# Patient Record
Sex: FEMALE | Race: WHITE | Employment: OTHER | URBAN - NONMETROPOLITAN AREA
[De-identification: names, ages, dates, MRNs, and addresses within clinical notes are randomized per-mention and may not be internally consistent; named-entity substitution may affect disease eponyms.]

---

## 2017-09-19 ENCOUNTER — HISTORY (OUTPATIENT)
Dept: FAMILY MEDICINE | Facility: OTHER | Age: 77
End: 2017-09-19

## 2018-01-24 ENCOUNTER — DOCUMENTATION ONLY (OUTPATIENT)
Dept: FAMILY MEDICINE | Facility: OTHER | Age: 78
End: 2018-01-24

## 2018-01-24 PROBLEM — G47.30 SLEEP APNEA: Status: ACTIVE | Noted: 2018-01-24

## 2018-01-24 PROBLEM — I10 HYPERTENSION: Status: ACTIVE | Noted: 2018-01-24

## 2018-01-24 PROBLEM — F34.1 DYSTHYMIA: Status: ACTIVE | Noted: 2018-01-24

## 2018-01-24 PROBLEM — G43.909 MIGRAINE HEADACHE: Status: ACTIVE | Noted: 2018-01-24

## 2018-01-24 RX ORDER — MOXIFLOXACIN 5 MG/ML
SOLUTION/ DROPS OPHTHALMIC
COMMUNITY
Start: 2017-09-25

## 2018-01-24 RX ORDER — GABAPENTIN 300 MG/1
600 CAPSULE ORAL 3 TIMES DAILY
COMMUNITY

## 2018-01-24 RX ORDER — PRAMIPEXOLE DIHYDROCHLORIDE 0.25 MG/1
TABLET ORAL
COMMUNITY

## 2018-01-24 RX ORDER — LISINOPRIL 40 MG/1
40 TABLET ORAL DAILY
COMMUNITY

## 2018-01-24 RX ORDER — BACLOFEN 10 MG/1
10 TABLET ORAL 3 TIMES DAILY PRN
COMMUNITY
Start: 2015-09-03

## 2018-01-24 RX ORDER — KETOROLAC TROMETHAMINE 5 MG/ML
SOLUTION OPHTHALMIC
COMMUNITY
Start: 2017-09-25

## 2018-01-24 RX ORDER — TIMOLOL MALEATE 2.5 MG/ML
1 SOLUTION/ DROPS OPHTHALMIC 2 TIMES DAILY
COMMUNITY

## 2018-01-24 RX ORDER — PREDNISOLONE ACETATE 10 MG/ML
SUSPENSION/ DROPS OPHTHALMIC
COMMUNITY
Start: 2017-09-25

## 2018-01-24 RX ORDER — LEVOTHYROXINE SODIUM 50 UG/1
50 TABLET ORAL
COMMUNITY

## 2018-01-24 RX ORDER — FLUTICASONE PROPIONATE 110 UG/1
1 AEROSOL, METERED RESPIRATORY (INHALATION) 2 TIMES DAILY
COMMUNITY
Start: 2015-06-03

## 2018-01-24 RX ORDER — LATANOPROST 50 UG/ML
1 SOLUTION/ DROPS OPHTHALMIC AT BEDTIME
COMMUNITY

## 2018-01-24 RX ORDER — HYDROCHLOROTHIAZIDE 12.5 MG/1
12.5 TABLET ORAL DAILY
COMMUNITY
Start: 2015-05-14

## 2018-01-24 RX ORDER — ONDANSETRON 4 MG/1
4 TABLET, FILM COATED ORAL EVERY 8 HOURS PRN
COMMUNITY
Start: 2015-06-03

## 2018-01-24 RX ORDER — FLUTICASONE PROPIONATE 50 MCG
1 SPRAY, SUSPENSION (ML) NASAL DAILY
COMMUNITY

## 2018-01-24 RX ORDER — LISINOPRIL 20 MG/1
20 TABLET ORAL DAILY
COMMUNITY
Start: 2015-05-14

## 2018-01-24 RX ORDER — RIZATRIPTAN BENZOATE 5 MG/1
5 TABLET, ORALLY DISINTEGRATING ORAL 2 TIMES DAILY PRN
COMMUNITY

## 2018-06-09 ENCOUNTER — OFFICE VISIT (OUTPATIENT)
Dept: FAMILY MEDICINE | Facility: OTHER | Age: 78
End: 2018-06-09
Attending: NURSE PRACTITIONER
Payer: MEDICARE

## 2018-06-09 VITALS
WEIGHT: 188.19 LBS | DIASTOLIC BLOOD PRESSURE: 96 MMHG | TEMPERATURE: 98.3 F | HEART RATE: 84 BPM | SYSTOLIC BLOOD PRESSURE: 180 MMHG | BODY MASS INDEX: 33.34 KG/M2

## 2018-06-09 DIAGNOSIS — R39.89 URINARY PROBLEM: Primary | ICD-10-CM

## 2018-06-09 DIAGNOSIS — R39.89 SUSPECTED URINARY TRACT INFECTION: ICD-10-CM

## 2018-06-09 LAB

## 2018-06-09 PROCEDURE — G0463 HOSPITAL OUTPT CLINIC VISIT: HCPCS

## 2018-06-09 PROCEDURE — 81001 URINALYSIS AUTO W/SCOPE: CPT | Performed by: NURSE PRACTITIONER

## 2018-06-09 PROCEDURE — 99213 OFFICE O/P EST LOW 20 MIN: CPT | Performed by: NURSE PRACTITIONER

## 2018-06-09 PROCEDURE — 87086 URINE CULTURE/COLONY COUNT: CPT | Performed by: NURSE PRACTITIONER

## 2018-06-09 RX ORDER — DICYCLOMINE HYDROCHLORIDE 10 MG/1
CAPSULE ORAL
COMMUNITY
Start: 2018-05-18

## 2018-06-09 RX ORDER — SULFAMETHOXAZOLE/TRIMETHOPRIM 800-160 MG
1 TABLET ORAL 2 TIMES DAILY
Qty: 6 TABLET | Refills: 0 | Status: SHIPPED | OUTPATIENT
Start: 2018-06-09 | End: 2018-06-12

## 2018-06-09 RX ORDER — ALLOPURINOL 100 MG/1
TABLET ORAL
COMMUNITY
Start: 2018-04-21

## 2018-06-09 RX ORDER — CEFPODOXIME PROXETIL 100 MG/1
TABLET, FILM COATED ORAL
COMMUNITY
Start: 2018-05-25

## 2018-06-09 RX ORDER — DIPHENOXYLATE HCL/ATROPINE 2.5-.025MG
TABLET ORAL
COMMUNITY
Start: 2018-05-03

## 2018-06-09 RX ORDER — BUTALBITAL, ACETAMINOPHEN AND CAFFEINE 50; 325; 40 MG/1; MG/1; MG/1
TABLET ORAL
COMMUNITY
Start: 2018-05-03

## 2018-06-09 NOTE — PROGRESS NOTES
Nursing Notes:   Anai Haynes CMA  6/9/2018  6:24 PM  Signed  Patient presents to the clinic for dizziness and urinary urgency that started night before last. Patient reports having dizziness also today. She states she wants to be tested for a urinary tract infection as she had dizziness as a UTI symptom previously.   Anai ZHENG CMA.......6/9/2018..5:32 PM      SUBJECTIVE:   Carina Bowles is a 77 year old female who presents to clinic today for the following health issues:    General illness.  She complains of dizziness and urinary urgency.  2 weeks ago this had occurred as well and she ended up having a urinary tract infection.  She denies fevers, chills, dysuria, nausea, vomiting.  The dizziness does make her have to lay down.  She denies weakness.  She is eating and drinking per her norm.    Prior to checking in to the rapid clinic we recommended that she actually go to the emergency room for her symptoms.  We explained to her that the rapid clinic does not usually workup dizziness.  She states that this is a UTI and she wants to be seen here tonight.  Explained to her that I would only work up for a UTI.  She was okay with this and verbalized understanding.  She understood that if there was further problems that she would have to go to the emergency room.    Problem list and histories reviewed & adjusted, as indicated.  Additional history: as documented    Current Outpatient Prescriptions   Medication Sig Dispense Refill     allopurinol (ZYLOPRIM) 100 MG tablet        amitriptyline (ELAVIL) 25 MG tablet Take 25 mg by mouth At Bedtime       baclofen (LIORESAL) 10 MG tablet Take 10 mg by mouth 3 times daily as needed       butalbital-acetaminophen-caffeine (FIORICET/ESGIC) -40 MG per tablet        cefpodoxime (VANTIN) 100 MG tablet        dicyclomine (BENTYL) 10 MG capsule        diphenoxylate-atropine (LOMOTIL) 2.5-0.025 MG per tablet        fluticasone (FLONASE) 50 MCG/ACT spray Spray 1 spray into both  nostrils daily       fluticasone (FLOVENT HFA) 110 MCG/ACT Inhaler Inhale 1 puff into the lungs 2 times daily       gabapentin (NEURONTIN) 300 MG capsule Take 600 mg by mouth 3 times daily       hydrochlorothiazide 12.5 MG TABS tablet Take 12.5 mg by mouth daily       ketorolac (ACULAR) 0.5 % ophthalmic solution        latanoprost (XALATAN) 0.005 % ophthalmic solution Place 1 drop into both eyes At Bedtime       levothyroxine (SYNTHROID/LEVOTHROID) 50 MCG tablet Take 50 mcg by mouth every morning (before breakfast)       lisinopril (PRINIVIL/ZESTRIL) 20 MG tablet Take 20 mg by mouth daily       lisinopril (PRINIVIL/ZESTRIL) 40 MG tablet Take 40 mg by mouth daily       metroNIDAZOLE (METROCREAM) 0.75 % cream Apply topically 2 times daily       moxifloxacin (VIGAMOX) 0.5 % ophthalmic solution        ondansetron (ZOFRAN) 4 MG tablet Take 4 mg by mouth every 8 hours as needed for nausea or vomiting       piroxicam (FELDENE) 20 MG capsule Take 20 mg by mouth daily with food       pramipexole (MIRAPEX) 0.25 MG tablet Take 0.25 mg by mouth once daily. Take 1-3 tabs every evening       prednisoLONE acetate (PRED FORTE) 1 % ophthalmic susp        rizatriptan (MAXALT-MLT) 5 MG ODT tab Place 5 mg under the tongue 2 times daily as needed for migraine Give a minimum 2hrs apart. Max dose: 30mg per 24hrs       sertraline (ZOLOFT) 50 MG tablet Take by mouth daily       sulfamethoxazole-trimethoprim (BACTRIM DS/SEPTRA DS) 800-160 MG per tablet Take 1 tablet by mouth 2 times daily for 3 days 6 tablet 0     timolol (TIMOPTIC) 0.25 % ophthalmic solution Place 1 drop into both eyes 2 times daily       Allergies   Allergen Reactions     Amoxicillin Diarrhea and Nausea     Amoxicillin-Pot Clavulanate Diarrhea and Nausea     Augmentin     Atenolol Other (See Comments)     General unwell feeling     Atropine Other (See Comments)     Has glaucoma     Codeine GI Disturbance and Nausea     Droperidol      Other reaction(s): Restless  Legs/Feet  Properidol, intense restless leg     Levofloxacin Other (See Comments)     Worsens inflammatory processes per daughter     Oxybutynin      Other reaction(s): Headache     Penicillins Other (See Comments)     Has allergy to Penicillin per [facility] scanned record 9/23/14     Tizanidine      Other reaction(s): Dizziness     Tolterodine Other (See Comments)     Allergy [facility] Detrol [facility])     Zolpidem      Other reaction(s): Dizziness  Other reaction(s): Dizziness         ROS:  Notable findings in the HPI.       OBJECTIVE:     BP (!) 180/96 (BP Location: Right arm, Patient Position: Sitting, Cuff Size: Adult Regular)  Pulse 84  Temp 98.3  F (36.8  C) (Tympanic)  Wt 188 lb 3 oz (85.4 kg)  Breastfeeding? No  BMI 33.34 kg/m2  Body mass index is 33.34 kg/(m^2).  GENERAL: alert, no distress, over weight and fatigued  EYES: Eyes grossly normal to inspection  NECK: no adenopathy  RESP: lungs clear to auscultation - no rales, rhonchi or wheezes  CV: regular rate and rhythm, normal S1 S2, no S3 or S4, no murmur, click or rub, no peripheral edema and peripheral pulses strong  ABDOMEN: soft, nontender, without hepatosplenomegaly or masses and bowel sounds normal  SKIN: no suspicious lesions or rashes  BACK: no CVA tenderness, no paralumbar tenderness  PSYCH: mentation appears normal, affect normal/bright    Diagnostic Test Results:  Results for orders placed or performed in visit on 06/09/18   Urinalysis w Reflex Microscopic If Positive   Result Value Ref Range    Color Urine Yellow     Appearance Urine Clear     Glucose Urine Negative NEG^Negative mg/dL    Bilirubin Urine Negative NEG^Negative    Ketones Urine Negative NEG^Negative mg/dL    Specific Gravity Urine 1.015 1.003 - 1.035    Blood Urine Trace (A) NEG^Negative    pH Urine 6.0 5.0 - 7.0 pH    Protein Albumin Urine Negative NEG^Negative mg/dL    Urobilinogen Urine 0.2 0.2 - 1.0 EU/dL     Nitrite Urine Negative NEG^Negative    Leukocyte Esterase Urine Negative NEG^Negative    Source Unspecified Urine    Urine Microscopic   Result Value Ref Range    WBC Urine 0 - 5 OTO5^0 - 5 /HPF    RBC Urine 2-5 (A) OTO2^O - 2 /HPF    Squamous Epithelial /LPF Urine Few FEW^Few /LPF    Bacteria Urine Few (A) NEG^Negative /HPF       ASSESSMENT/PLAN:     1. Urinary problem  - Urinalysis w Reflex Microscopic If Positive  - Urine Microscopic    2. Suspected urinary tract infection  - sulfamethoxazole-trimethoprim (BACTRIM DS/SEPTRA DS) 800-160 MG per tablet; Take 1 tablet by mouth 2 times daily for 3 days  Dispense: 6 tablet; Refill: 0  - Urine Culture Aerobic Bacterial    Medical Decision Making:    Differential Diagnosis:  UTI: UTI and Pyelonephritis      PLAN:    UTI Adult:  NO Sulfa Allergy: Septra DS one tablet twice daily for 3 days    Followup:    If not improving or if condition worsens, follow up with your Primary Care Provider    Disclaimer:  This note consists of words and symbols derived from keyboarding, dictation, or using voice recognition software. As a result, there may be errors in the script that have gone undetected. Please consider this when interpreting information found in this note.      Sosa Key NP, 6/9/2018 6:16 PM

## 2018-06-09 NOTE — NURSING NOTE
Patient presents to the clinic for dizziness and urinary urgency that started night before last. Patient reports having dizziness also today. She states she wants to be tested for a urinary tract infection as she had dizziness as a UTI symptom previously.   Anai ZHENG CMA.......6/9/2018..5:32 PM

## 2018-06-09 NOTE — MR AVS SNAPSHOT
After Visit Summary   6/9/2018    Carina Bowles    MRN: [id number]           Patient Information     Date Of Birth          1940        Visit Information        Provider Department      6/9/2018 5:00 PM Sosa Key NP [facility]        Today's Diagnoses     Urinary problem    -  1    Suspected urinary tract infection          Care Instructions      Urinary Tract Infections in Women  Urinary tract infections (UTIs) are most often caused by bacteria. These bacteria enter the urinary tract. The bacteria may come from outside the body. Or they may travel from the skin outside the rectum or vagina into the urethra. Female anatomy makes it easy for bacteria from the bowel to enter a woman s urinary tract, which is the most common source of UTI. This means women develop UTIs more often than men. Pain in or around the urinary tract is a common UTI symptom. But the only way to know for sure if you have a UTI for the healthcare provider to test your urine. The two tests that may be done are the urinalysis and urine culture.  Types of UTIs    Cystitis. A bladder infection (cystitis) is the most common UTI in women. You may have urgent or frequent urination. You may also have pain, burning when you urinate, and bloody urine.    Urethritis. This is an inflamed urethra, which is the tube that carries urine from the bladder to outside the body. You may have lower stomach or back pain. You may also have urgent or frequent urination.    Pyelonephritis. This is a kidney infection. If not treated, it can be serious and damage your kidneys. In severe cases, you may need to stay in the hospital. You may have a fever and lower back pain.  Medicines to treat a UTI  Most UTIs are treated with antibiotics. These kill the bacteria. The length of time you need to take them depends on the type of infection. It may be as short as 3 days. If you have repeated UTIs, you may need a low-dose  antibiotic for several months. Take antibiotics exactly as directed. Don t stop taking them until all of the medicine is gone. If you stop taking the antibiotic too soon, the infection may not go away. You may also develop a resistance to the antibiotic. This can make it much harder to treat.  Lifestyle changes to treat and prevent UTIs  The lifestyle changes below will help get rid of your UTI. They may also help prevent future UTIs.    Drink plenty of fluids. This includes water, juice, or other caffeine-free drinks. Fluids help flush bacteria out of your body.    Empty your bladder. Always empty your bladder when you feel the urge to urinate. And always urinate before going to sleep. Urine that stays in your bladder can lead to infection. Try to urinate before and after sex as well.    Practice good personal hygiene. Wipe yourself from front to back after using the toilet. This helps keep bacteria from getting into the urethra.    Use condoms during sex. These help prevent UTIs caused by sexually transmitted bacteria. Also don't use spermicides during sex. These can increase the risk for UTIs. Choose other forms of birth control instead. For women who tend to get UTIs after sex, a low-dose of a preventive antibiotic may be used. Be sure to discuss this option with your healthcare provider.    Follow up with your healthcare provider as directed. He or she may test to make sure the infection has cleared. If needed, more treatment may be started.                  Follow-ups after your visit        Who to contact     If you have questions or need follow up information about today's clinic visit or your schedule please contact [facility] directly at [phone].  Normal or non-critical lab and imaging results will be communicated to you by MyChart, letter or phone within 4 business days after the clinic has received the results. If you do not hear from us within 7 days, please contact the clinic  "through [employer] or phone. If you have a critical or abnormal lab result, we will notify you by phone as soon as possible.  Submit refill requests through [employer] or call your pharmacy and they will forward the refill request to us. Please allow 3 business days for your refill to be completed.          Additional Information About Your Visit        [employer] Information     [employer] lets you send messages to your doctor, view your test results, renew your prescriptions, schedule appointments and more. To sign up, go to [email or web address] . Click on \"Log in\" on the left side of the screen, which will take you to the Welcome page. Then click on \"Sign up Now\" on the right side of the page.     You will be asked to enter the access code listed below, as well as some personal information. Please follow the directions to create your username and password.     Your access code is: [email or web address]  Expires: 2018  6:34 PM     Your access code will  in 90 days. If you need help or a new code, please call your [place] clinic or [phone].        Care EveryWhere ID     This is your Care EveryWhere ID. This could be used by other organizations to access your [place] medical records  [id number]        Your Vitals Were     Pulse Temperature Breastfeeding? BMI (Body Mass Index)          84 98.3  F (36.8  C) (Tympanic) No 33.34 kg/m2         Blood Pressure from Last 3 Encounters:   18 (!) 180/96   06/03/15 110/60   05/28/15 142/70    Weight from Last 3 Encounters:   18 188 lb 3 oz (85.4 kg)   06/03/15 183 lb (83 kg)   05/28/15 182 lb (82.6 kg)              We Performed the Following     Urinalysis w Reflex Microscopic If Positive     Urine Culture Aerobic Bacterial     Urine Microscopic          Today's Medication Changes          These changes are accurate as of 18  6:34 PM.  If you have any questions, ask your nurse or doctor.               Start taking these medicines.        Dose/Directions    " sulfamethoxazole-trimethoprim 800-160 MG per tablet   Commonly known as:  BACTRIM DS/SEPTRA DS   Used for:  Suspected urinary tract infection   Started by:  Sosa Key NP        Dose:  1 tablet   Take 1 tablet by mouth 2 times daily for 3 days   Quantity:  6 tablet   Refills:  0            Where to get your medicines      These medications were sent to [facility] [address] [place] [address]     Phone:  [phone]     sulfamethoxazole-trimethoprim 800-160 MG per tablet                Primary Care Provider Fax #    Physician No Ref-Primary [phone]       No address on file        Equal Access to Services     [facility] : Hadhussain Marin, waveronika lusheila, michelet kaalmada rosa, angie deutsch. So [facility] [phone].    ATENCIÓN: Si habla español, tiene a hampton disposición servicios gratuitos de asistencia lingüística. [facility] [phone].    We comply with applicable federal civil rights laws and [place] laws. We do not discriminate on the basis of race, color, national origin, age, disability, sex, sexual orientation, or gender identity.            Thank you!     Thank you for choosing [facility]  for your care. Our goal is always to provide you with excellent care. Hearing back from our patients is one way we can continue to improve our services. Please take a few minutes to complete the written survey that you may receive in the mail after your visit with us. Thank you!             Your Updated Medication List - Protect others around you: Learn how to safely use, store and throw away your medicines at www.disposemymeds.org.          This list is accurate as of 6/9/18  6:34 PM.  Always use your most recent med list.                   Brand Name Dispense Instructions for use Diagnosis    allopurinol 100 MG tablet    ZYLOPRIM          amitriptyline 25 MG tablet     ELAVIL     Take 25 mg by mouth At Bedtime        baclofen 10 MG tablet    LIORESAL     Take 10 mg by mouth 3 times daily as needed        butalbital-acetaminophen-caffeine -40 MG per tablet    FIORICET/ESGIC          cefpodoxime 100 MG tablet    VANTIN          dicyclomine 10 MG capsule    BENTYL          diphenoxylate-atropine 2.5-0.025 MG per tablet    LOMOTIL          fluticasone 110 MCG/ACT Inhaler    FLOVENT HFA     Inhale 1 puff into the lungs 2 times daily        fluticasone 50 MCG/ACT spray    FLONASE     Spray 1 spray into both nostrils daily        gabapentin 300 MG capsule    NEURONTIN     Take 600 mg by mouth 3 times daily        hydrochlorothiazide 12.5 MG Tabs tablet      Take 12.5 mg by mouth daily        ketorolac 0.5 % ophthalmic solution    ACULAR          latanoprost 0.005 % ophthalmic solution    XALATAN     Place 1 drop into both eyes At Bedtime        levothyroxine 50 MCG tablet    SYNTHROID/LEVOTHROID     Take 50 mcg by mouth every morning (before breakfast)        * lisinopril 40 MG tablet    PRINIVIL/ZESTRIL     Take 40 mg by mouth daily        * lisinopril 20 MG tablet    PRINIVIL/ZESTRIL     Take 20 mg by mouth daily        metroNIDAZOLE 0.75 % cream    METROCREAM     Apply topically 2 times daily        moxifloxacin 0.5 % ophthalmic solution    VIGAMOX          ondansetron 4 MG tablet    ZOFRAN     Take 4 mg by mouth every 8 hours as needed for nausea or vomiting        piroxicam 20 MG capsule    FELDENE     Take 20 mg by mouth daily with food        pramipexole 0.25 MG tablet    MIRAPEX     Take 0.25 mg by mouth once daily. Take 1-3 tabs every evening        prednisoLONE acetate 1 % ophthalmic susp    PRED FORTE          rizatriptan 5 MG ODT tab    MAXALT-MLT     Place 5 mg under the tongue 2 times daily as needed for migraine Give a minimum 2hrs apart. Max dose: 30mg per 24hrs        sulfamethoxazole-trimethoprim 800-160 MG per tablet    BACTRIM DS/SEPTRA DS    6 tablet    Take 1  tablet by mouth 2 times daily for 3 days    Suspected urinary tract infection       timolol 0.25 % ophthalmic solution    TIMOPTIC     Place 1 drop into both eyes 2 times daily        ZOLOFT 50 MG tablet   Generic drug:  sertraline      Take by mouth daily        * Notice:  This list has 2 medication(s) that are the same as other medications prescribed for you. Read the directions carefully, and ask your doctor or other care provider to review them with you.

## 2018-06-09 NOTE — PATIENT INSTRUCTIONS
Urinary Tract Infections in Women  Urinary tract infections (UTIs) are most often caused by bacteria. These bacteria enter the urinary tract. The bacteria may come from outside the body. Or they may travel from the skin outside the rectum or vagina into the urethra. Female anatomy makes it easy for bacteria from the bowel to enter a woman s urinary tract, which is the most common source of UTI. This means women develop UTIs more often than men. Pain in or around the urinary tract is a common UTI symptom. But the only way to know for sure if you have a UTI for the healthcare provider to test your urine. The two tests that may be done are the urinalysis and urine culture.  Types of UTIs    Cystitis. A bladder infection (cystitis) is the most common UTI in women. You may have urgent or frequent urination. You may also have pain, burning when you urinate, and bloody urine.    Urethritis. This is an inflamed urethra, which is the tube that carries urine from the bladder to outside the body. You may have lower stomach or back pain. You may also have urgent or frequent urination.    Pyelonephritis. This is a kidney infection. If not treated, it can be serious and damage your kidneys. In severe cases, you may need to stay in the hospital. You may have a fever and lower back pain.  Medicines to treat a UTI  Most UTIs are treated with antibiotics. These kill the bacteria. The length of time you need to take them depends on the type of infection. It may be as short as 3 days. If you have repeated UTIs, you may need a low-dose antibiotic for several months. Take antibiotics exactly as directed. Don t stop taking them until all of the medicine is gone. If you stop taking the antibiotic too soon, the infection may not go away. You may also develop a resistance to the antibiotic. This can make it much harder to treat.  Lifestyle changes to treat and prevent UTIs  The lifestyle changes below will help get rid of your UTI. They  may also help prevent future UTIs.    Drink plenty of fluids. This includes water, juice, or other caffeine-free drinks. Fluids help flush bacteria out of your body.    Empty your bladder. Always empty your bladder when you feel the urge to urinate. And always urinate before going to sleep. Urine that stays in your bladder can lead to infection. Try to urinate before and after sex as well.    Practice good personal hygiene. Wipe yourself from front to back after using the toilet. This helps keep bacteria from getting into the urethra.    Use condoms during sex. These help prevent UTIs caused by sexually transmitted bacteria. Also don't use spermicides during sex. These can increase the risk for UTIs. Choose other forms of birth control instead. For women who tend to get UTIs after sex, a low-dose of a preventive antibiotic may be used. Be sure to discuss this option with your healthcare provider.    Follow up with your healthcare provider as directed. He or she may test to make sure the infection has cleared. If needed, more treatment may be started.

## 2018-06-11 LAB
BACTERIA SPEC CULT: NORMAL
SPECIMEN SOURCE: NORMAL

## 2018-06-12 ENCOUNTER — TRANSFERRED RECORDS (OUTPATIENT)
Dept: HEALTH INFORMATION MANAGEMENT | Facility: OTHER | Age: 78
End: 2018-06-12

## 2023-01-21 ENCOUNTER — HOSPITAL ENCOUNTER (EMERGENCY)
Dept: HOSPITAL 12 - ER | Age: 83
Discharge: HOME | End: 2023-01-21
Payer: COMMERCIAL

## 2023-01-21 VITALS — DIASTOLIC BLOOD PRESSURE: 82 MMHG | SYSTOLIC BLOOD PRESSURE: 148 MMHG

## 2023-01-21 VITALS — HEIGHT: 63 IN | WEIGHT: 190 LBS | BODY MASS INDEX: 33.66 KG/M2

## 2023-01-21 DIAGNOSIS — R11.0: Primary | ICD-10-CM

## 2023-01-21 DIAGNOSIS — R42: ICD-10-CM

## 2023-01-21 DIAGNOSIS — R94.31: ICD-10-CM

## 2023-01-21 DIAGNOSIS — Z88.0: ICD-10-CM

## 2023-01-21 DIAGNOSIS — N39.0: ICD-10-CM

## 2023-01-21 DIAGNOSIS — I10: ICD-10-CM

## 2023-01-21 DIAGNOSIS — R51.9: ICD-10-CM

## 2023-01-21 LAB
APPEARANCE UR: (no result)
BILIRUB UR QL STRIP: NEGATIVE
BUN SERPL-MCNC: 21 MG/DL (ref 7–18)
CA PHOS CRY URNS QL MICRO: (no result) /HPF
CHLORIDE SERPL-SCNC: 109 MMOL/L (ref 98–107)
CO2 SERPL-SCNC: 19 MMOL/L (ref 21–32)
COLOR UR: YELLOW
CREAT SERPL-MCNC: 1 MG/DL (ref 0.6–1.3)
DEPRECATED SQUAMOUS URNS QL MICRO: (no result) /HPF
GLUCOSE SERPL-MCNC: 242 MG/DL (ref 74–106)
GLUCOSE UR STRIP-MCNC: NEGATIVE MG/DL
HCT VFR BLD AUTO: 35.7 % (ref 31.2–41.9)
HGB UR QL STRIP: (no result)
KETONES UR STRIP-MCNC: NEGATIVE MG/DL
LEUKOCYTE ESTERASE UR QL STRIP: (no result)
MCH RBC QN AUTO: 24.7 UUG (ref 24.7–32.8)
MCV RBC AUTO: 77.1 FL (ref 75.5–95.3)
NITRITE UR QL STRIP: POSITIVE
PH UR STRIP: 8.5 [PH] (ref 5–8)
PLATELET # BLD AUTO: 117 K/UL (ref 179–408)
POTASSIUM SERPL-SCNC: 3.8 MMOL/L (ref 3.5–5.1)
RBC #/AREA URNS HPF: (no result) /HPF (ref 0–3)
SP GR UR STRIP: 1.02 (ref 1–1.03)
UROBILINOGEN UR STRIP-MCNC: 0.2 E.U./DL
WBC #/AREA URNS HPF: (no result) /HPF
WBC #/AREA URNS HPF: (no result) /HPF (ref 0–3)

## 2023-01-21 PROCEDURE — 96374 THER/PROPH/DIAG INJ IV PUSH: CPT

## 2023-01-21 PROCEDURE — 85025 COMPLETE CBC W/AUTO DIFF WBC: CPT

## 2023-01-21 PROCEDURE — 84484 ASSAY OF TROPONIN QUANT: CPT

## 2023-01-21 PROCEDURE — 85651 RBC SED RATE NONAUTOMATED: CPT

## 2023-01-21 PROCEDURE — 81001 URINALYSIS AUTO W/SCOPE: CPT

## 2023-01-21 PROCEDURE — 36415 COLL VENOUS BLD VENIPUNCTURE: CPT

## 2023-01-21 PROCEDURE — 80048 BASIC METABOLIC PNL TOTAL CA: CPT

## 2023-01-21 PROCEDURE — A4663 DIALYSIS BLOOD PRESSURE CUFF: HCPCS

## 2023-01-21 PROCEDURE — 71045 X-RAY EXAM CHEST 1 VIEW: CPT

## 2023-01-21 PROCEDURE — 85730 THROMBOPLASTIN TIME PARTIAL: CPT

## 2023-01-21 PROCEDURE — 70450 CT HEAD/BRAIN W/O DYE: CPT

## 2023-01-21 PROCEDURE — 96375 TX/PRO/DX INJ NEW DRUG ADDON: CPT

## 2023-01-21 PROCEDURE — 99285 EMERGENCY DEPT VISIT HI MDM: CPT

## 2023-01-21 PROCEDURE — 93005 ELECTROCARDIOGRAM TRACING: CPT

## 2023-03-24 ENCOUNTER — HOSPITAL ENCOUNTER (INPATIENT)
Dept: HOSPITAL 54 - ER | Age: 83
LOS: 4 days | Discharge: HOME | DRG: 640 | End: 2023-03-28
Attending: INTERNAL MEDICINE | Admitting: INTERNAL MEDICINE
Payer: COMMERCIAL

## 2023-03-24 VITALS — WEIGHT: 185 LBS | BODY MASS INDEX: 31.58 KG/M2 | HEIGHT: 64 IN

## 2023-03-24 DIAGNOSIS — I13.10: ICD-10-CM

## 2023-03-24 DIAGNOSIS — Z79.82: ICD-10-CM

## 2023-03-24 DIAGNOSIS — F02.84: ICD-10-CM

## 2023-03-24 DIAGNOSIS — F02.83: ICD-10-CM

## 2023-03-24 DIAGNOSIS — D63.8: ICD-10-CM

## 2023-03-24 DIAGNOSIS — M79.601: ICD-10-CM

## 2023-03-24 DIAGNOSIS — N17.0: ICD-10-CM
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
## 2023-03-24 DIAGNOSIS — G30.9: ICD-10-CM

## 2023-03-24 DIAGNOSIS — E03.9: ICD-10-CM

## 2023-03-24 DIAGNOSIS — Z79.899: ICD-10-CM

## 2023-03-24 DIAGNOSIS — E87.5: ICD-10-CM

## 2023-03-24 DIAGNOSIS — E86.1: ICD-10-CM

## 2023-03-24 DIAGNOSIS — F32.9: ICD-10-CM

## 2023-03-24 DIAGNOSIS — N18.9: ICD-10-CM

## 2023-03-24 DIAGNOSIS — R62.7: Primary | ICD-10-CM

## 2023-03-24 DIAGNOSIS — G93.41: ICD-10-CM

## 2023-03-24 DIAGNOSIS — M79.7: ICD-10-CM

## 2023-03-24 PROCEDURE — C9803 HOPD COVID-19 SPEC COLLECT: HCPCS

## 2023-03-24 PROCEDURE — G0378 HOSPITAL OBSERVATION PER HR: HCPCS

## 2023-03-24 NOTE — NUR
BIB  FROM A BOARD AND CARE, C/O RIGHT ARM PAIN AFTER A GLF, FAMILY WANTS 
HER EVALUATED FOR WORSENING "CONFUSION". PLACED IN BED, AAOX3, BREATHING EVEN 
AND UNLABORED SATURATING AT 96%RA.

## 2023-03-24 NOTE — NUR
Per [employer] "Daughter Pricila [phone] (POA) expressed concerns about her 
getting more disoriented/confused. Gets easily agitated She wants psych 
evaluation." The [facility] could NOT handle her anymore

## 2023-03-24 NOTE — NUR
spoke with the daughter and would like to have c.diff test to her mother since 
she is having watery diarrhea according to the boarding caregivers. md made 
aware. plan of care continues

## 2023-03-25 VITALS — SYSTOLIC BLOOD PRESSURE: 110 MMHG | DIASTOLIC BLOOD PRESSURE: 71 MMHG

## 2023-03-25 VITALS — SYSTOLIC BLOOD PRESSURE: 127 MMHG | DIASTOLIC BLOOD PRESSURE: 55 MMHG

## 2023-03-25 VITALS — SYSTOLIC BLOOD PRESSURE: 112 MMHG | DIASTOLIC BLOOD PRESSURE: 48 MMHG

## 2023-03-25 VITALS — DIASTOLIC BLOOD PRESSURE: 48 MMHG | SYSTOLIC BLOOD PRESSURE: 110 MMHG

## 2023-03-25 VITALS — DIASTOLIC BLOOD PRESSURE: 73 MMHG | SYSTOLIC BLOOD PRESSURE: 153 MMHG

## 2023-03-25 VITALS — DIASTOLIC BLOOD PRESSURE: 48 MMHG | SYSTOLIC BLOOD PRESSURE: 112 MMHG

## 2023-03-25 LAB
BASOPHILS # BLD AUTO: 0 K/UL (ref 0–0.2)
BASOPHILS NFR BLD AUTO: 0.5 % (ref 0–2)
BUN SERPL-MCNC: 51 MG/DL (ref 7–18)
CALCIUM SERPL-MCNC: 10 MG/DL (ref 8.5–10.1)
CHLORIDE SERPL-SCNC: 111 MMOL/L (ref 98–107)
CO2 SERPL-SCNC: 18 MMOL/L (ref 21–32)
CREAT SERPL-MCNC: 2 MG/DL (ref 0.6–1.3)
EOSINOPHIL NFR BLD AUTO: 2.2 % (ref 0–6)
GLUCOSE SERPL-MCNC: 137 MG/DL (ref 74–106)
HCT VFR BLD AUTO: 36 % (ref 33–45)
HGB BLD-MCNC: 11.4 G/DL (ref 11.5–14.8)
LYMPHOCYTES NFR BLD AUTO: 0.9 K/UL (ref 0.8–4.8)
LYMPHOCYTES NFR BLD AUTO: 16.9 % (ref 20–44)
MAGNESIUM SERPL-MCNC: 2.1 MG/DL (ref 1.8–2.4)
MCHC RBC AUTO-ENTMCNC: 31 G/DL (ref 31–36)
MCV RBC AUTO: 80 FL (ref 82–100)
MONOCYTES NFR BLD AUTO: 0.4 K/UL (ref 0.1–1.3)
MONOCYTES NFR BLD AUTO: 6.7 % (ref 2–12)
NEUTROPHILS # BLD AUTO: 4 K/UL (ref 1.8–8.9)
NEUTROPHILS NFR BLD AUTO: 73.7 % (ref 43–81)
PHOSPHATE SERPL-MCNC: 4.2 MG/DL (ref 2.5–4.9)
PLATELET # BLD AUTO: 114 K/UL (ref 150–450)
POTASSIUM SERPL-SCNC: 4.8 MMOL/L (ref 3.5–5.1)
RBC # BLD AUTO: 4.55 MIL/UL (ref 4–5.2)
SODIUM SERPL-SCNC: 141 MMOL/L (ref 136–145)
TSH SERPL DL<=0.005 MIU/L-ACNC: 3.41 UIU/ML (ref 0.36–3.74)
WBC NRBC COR # BLD AUTO: 5.5 K/UL (ref 4.3–11)

## 2023-03-25 RX ADMIN — DORZOLAMIDE HYDROCHLORIDE AND TIMOLOL MALEATE SCH DROP: 20; 5 SOLUTION OPHTHALMIC at 16:49

## 2023-03-25 RX ADMIN — DORZOLAMIDE HYDROCHLORIDE AND TIMOLOL MALEATE SCH ML: 20; 5 SOLUTION OPHTHALMIC at 09:00

## 2023-03-25 RX ADMIN — QUETIAPINE SCH MG: 25 TABLET, FILM COATED ORAL at 16:49

## 2023-03-25 RX ADMIN — GABAPENTIN SCH MG: 300 CAPSULE ORAL at 09:00

## 2023-03-25 RX ADMIN — ONDANSETRON PRN MG: 4 TABLET, ORALLY DISINTEGRATING ORAL at 17:43

## 2023-03-25 RX ADMIN — SERTRALINE HYDROCHLORIDE SCH MG: 50 TABLET, FILM COATED ORAL at 11:35

## 2023-03-25 RX ADMIN — ENOXAPARIN SODIUM SCH MG: 30 INJECTION SUBCUTANEOUS at 21:12

## 2023-03-25 RX ADMIN — PENTOXIFYLLINE SCH MG: 400 TABLET, FILM COATED, EXTENDED RELEASE ORAL at 09:00

## 2023-03-25 RX ADMIN — SERTRALINE HYDROCHLORIDE SCH MG: 50 TABLET, FILM COATED ORAL at 09:00

## 2023-03-25 RX ADMIN — GABAPENTIN SCH MG: 300 CAPSULE ORAL at 16:49

## 2023-03-25 RX ADMIN — PENTOXIFYLLINE SCH MG: 400 TABLET, FILM COATED, EXTENDED RELEASE ORAL at 16:49

## 2023-03-25 RX ADMIN — Medication SCH MG: at 09:00

## 2023-03-25 RX ADMIN — CHOLESTYRAMINE SCH G: 4 POWDER, FOR SUSPENSION ORAL at 12:30

## 2023-03-25 RX ADMIN — CHOLESTYRAMINE SCH G: 4 POWDER, FOR SUSPENSION ORAL at 00:30

## 2023-03-25 RX ADMIN — QUETIAPINE SCH MG: 25 TABLET, FILM COATED ORAL at 09:00

## 2023-03-25 RX ADMIN — LISINOPRIL SCH MG: 20 TABLET ORAL at 09:00

## 2023-03-25 RX ADMIN — LEVOTHYROXINE SODIUM SCH MCG: 75 TABLET ORAL at 08:01

## 2023-03-25 RX ADMIN — FERROUS SULFATE TAB 325 MG (65 MG ELEMENTAL FE) SCH MG: 325 (65 FE) TAB at 09:00

## 2023-03-25 RX ADMIN — AMLODIPINE BESYLATE SCH MG: 5 TABLET ORAL at 09:00

## 2023-03-25 RX ADMIN — ACETAMINOPHEN PRN MG: 325 TABLET ORAL at 10:01

## 2023-03-25 RX ADMIN — FERROUS SULFATE TAB 325 MG (65 MG ELEMENTAL FE) SCH MG: 325 (65 FE) TAB at 16:49

## 2023-03-25 RX ADMIN — QUETIAPINE SCH MG: 25 TABLET, FILM COATED ORAL at 11:35

## 2023-03-25 NOTE — NUR
RN ADMISSION NOTE

ADMITTED THIS PATIENT FROM ER VIA [facility]. PATIENT IS AWAKE, ALERT AND ORIENTED X 2 WITH 
PERIODS OF CONFUSION. VS TAKEN AS FOLLOWS: T 97.6, NV 91, RR 19, O2 SAT 98%, /73. 
AFEBRILE. NOT IN ANY FORM OF RESPIRATORY OR CARDIAC DISTRESS NOTED AT THIS TIME. NO IV 
ACCESS. MD AWARE. BODY AND SKIN ASSESSMENT DONE. PICTURES TAKEN AND PLACED TO CHART. SAFETY 
PRECAUTIONS IMPLEMENTED: CALL LIGHT AND TABLE WITHIN REACH, SIDE RAILS UP X 3, BED IN LOWEST 
LOCKED POSITION. WILL CONTINUE PLAN OF CARE.

## 2023-03-25 NOTE — NUR
RN CLOSING NOTE

PATIENT IN BED; IS AWAKE, ALERT AND ORIENTED X 2 WITH PERIODS OF CONFUSION. STABLE ON ROOM 
AIR. IN NO ACUTE DISTRESS. NO S/S OF PAIN OR DISCOMFORT NOTED AT THIS TIME. SAFETY 
PRECAUTIONS IN PLACE: BED ALARM ON, CALL LIGHT AND TABLE WITHIN REACH, SIDE RAILS UP X 3, 
BED IN LOWEST LOCKED POSITION. ENDORSED TO MORNING SHIFT FOR GIDEON.

## 2023-03-25 NOTE — NUR
RN NOTES



PATIENT NOTED TO BE MORE COMPLIANT WITH CARE, FREQUENT REORIENTING DONE WHEN EPISODE OF 
CONFUSION ARE NOTED. WILL CONTINUE TO MONITOR.

## 2023-03-25 NOTE — NUR
RN NOTES



PATIENT REQUESTED FOR PRN TYLENOL FOR PAIN IN HER R ARM. PRN TYLENOL ADMINISTERED, ICE PACK 
GIVEN. WILL CONTINUE TO MONITOR.

## 2023-03-25 NOTE — NUR
RN NOTES



PATIENT REFUSED ALL 0900 MEDICATION, STATED SHE JUST WANTS TO SLEEP AND SHE'LL TAKE THEM 
TOMORROW MORNING. RISKS AND BENEFITS EXPLAINED. CHARGE NURSE AWARE, WILL CONTINUE TO 
MONITOR.

## 2023-03-25 NOTE — NUR
TELE RN CLOSING NOTES



PATIENT AWAKE IN BED, A/Ox,2-3 EPISODES OF CONFUSION. STABLE ON ROOM AIR, NO S/S OF 
RESPIRATORY DISTRESS. ON TELE MONITORING BUT CURRENTLY REFUSING. NO IV ACCESS PATIENT IS 
REFUSING. PATIENT IS INCONTINENT USES DIAPER. SKIN ISSUES: SACRAL REDNESS, L LEG, R LEG L 
THIGH BRUISES, R&L SOLE DRY SKIN. SAFETY MEASURES MAINTAINED: BED LOCKED AND IN LOWEST 
POSITION, HOB ELEVATED, SIDE RAILS UPx3, BED ALARM ON, CALL LIGHT WITHIN REACH. WILL ENDORSE 
TO NEXT SHIFT ANY GIDEON.

## 2023-03-25 NOTE — NUR
TELE RN OPENING NOTES



RECEIVED PATIENT AWAKE IN BED, A/Ox,3 EPISODES OF CONFUSION. AGITATED AND YELLING. ON ROOM 
AIR, NO S/S OF RESPIRATORY DISTRESS. ON TELE MONITORING BUT CURRENTLY REFUSING. NO IV ACCESS 
PATIENT IS REFUSING. PATIENT IS INCONTINENT USES DIAPER. SKIN ISSUES: SACRAL REDNESS, L LEG, 
R LEG L THIGH BRUISES, R&L SOLE DRY SKIN. SAFETY MEASURES IN PLACE: BED LOCKED AND IN LOWEST 
POSITION, HOB ELEVATED, SIDE RAILS UPx3, BED ALARM ON, CALL LIGHT WITHIN REACH. WILL 
CONTINUE TO MONITOR.

## 2023-03-25 NOTE — NUR
RECEIVED PATIENT IN BED, ALERT/ORIENTED X1, ROOM AIR, NO COMPLAIN OF PAIN, CONFUSED, 
RE-ORIENTED TO TIME, PLACE AND SITUATION, VS TAKEN, KEPT SAFE, FALL PRECAUTION, WILL 
CONTINUE TO MONITOR.

## 2023-03-25 NOTE — NUR
RN NOTE

PATIENT IS EASILY AGITATED. PATIENT REFUSED TO TAKE QUESTRAN AND REFUSED TO RECEIVE LOVENOX. 
KEPT COMFORTABLE IN BED. WILL CONTINUE TO MONITOR

## 2023-03-25 NOTE — NUR
SPOKE WITH DADA BAZZI, DAUGHTER AND POA OF PATIENT TO GET CONSENT FOR MRI WO BRAIN. 
ALSO WITNESSED BY FELICITA SEE RN.

## 2023-03-26 VITALS — SYSTOLIC BLOOD PRESSURE: 100 MMHG | DIASTOLIC BLOOD PRESSURE: 61 MMHG

## 2023-03-26 VITALS — DIASTOLIC BLOOD PRESSURE: 50 MMHG | SYSTOLIC BLOOD PRESSURE: 113 MMHG

## 2023-03-26 VITALS — DIASTOLIC BLOOD PRESSURE: 48 MMHG | SYSTOLIC BLOOD PRESSURE: 117 MMHG

## 2023-03-26 VITALS — SYSTOLIC BLOOD PRESSURE: 107 MMHG | DIASTOLIC BLOOD PRESSURE: 43 MMHG

## 2023-03-26 VITALS — SYSTOLIC BLOOD PRESSURE: 110 MMHG | DIASTOLIC BLOOD PRESSURE: 51 MMHG

## 2023-03-26 VITALS — DIASTOLIC BLOOD PRESSURE: 44 MMHG | SYSTOLIC BLOOD PRESSURE: 109 MMHG

## 2023-03-26 LAB
BUN SERPL-MCNC: 51 MG/DL (ref 7–18)
CALCIUM SERPL-MCNC: 9.9 MG/DL (ref 8.5–10.1)
CHLORIDE SERPL-SCNC: 111 MMOL/L (ref 98–107)
CO2 SERPL-SCNC: 17 MMOL/L (ref 21–32)
CREAT SERPL-MCNC: 2.1 MG/DL (ref 0.6–1.3)
GLUCOSE SERPL-MCNC: 151 MG/DL (ref 74–106)
POTASSIUM SERPL-SCNC: 5.1 MMOL/L (ref 3.5–5.1)
SODIUM SERPL-SCNC: 140 MMOL/L (ref 136–145)

## 2023-03-26 RX ADMIN — LORAZEPAM PRN MG: 0.5 TABLET ORAL at 16:33

## 2023-03-26 RX ADMIN — DORZOLAMIDE HYDROCHLORIDE AND TIMOLOL MALEATE SCH DROP: 20; 5 SOLUTION OPHTHALMIC at 08:10

## 2023-03-26 RX ADMIN — SERTRALINE HYDROCHLORIDE SCH MG: 50 TABLET, FILM COATED ORAL at 08:11

## 2023-03-26 RX ADMIN — ACETAMINOPHEN PRN MG: 325 TABLET ORAL at 08:12

## 2023-03-26 RX ADMIN — LISINOPRIL SCH MG: 20 TABLET ORAL at 08:12

## 2023-03-26 RX ADMIN — LEVOTHYROXINE SODIUM SCH MCG: 75 TABLET ORAL at 08:10

## 2023-03-26 RX ADMIN — QUETIAPINE SCH MG: 25 TABLET, FILM COATED ORAL at 08:10

## 2023-03-26 RX ADMIN — DORZOLAMIDE HYDROCHLORIDE AND TIMOLOL MALEATE SCH DROP: 20; 5 SOLUTION OPHTHALMIC at 16:25

## 2023-03-26 RX ADMIN — CHOLESTYRAMINE SCH G: 4 POWDER, FOR SUSPENSION ORAL at 12:40

## 2023-03-26 RX ADMIN — FERROUS SULFATE TAB 325 MG (65 MG ELEMENTAL FE) SCH MG: 325 (65 FE) TAB at 16:25

## 2023-03-26 RX ADMIN — PENTOXIFYLLINE SCH MG: 400 TABLET, FILM COATED, EXTENDED RELEASE ORAL at 16:26

## 2023-03-26 RX ADMIN — Medication SCH MG: at 08:11

## 2023-03-26 RX ADMIN — ENOXAPARIN SODIUM SCH MG: 30 INJECTION SUBCUTANEOUS at 21:23

## 2023-03-26 RX ADMIN — GABAPENTIN SCH MG: 300 CAPSULE ORAL at 08:11

## 2023-03-26 RX ADMIN — PENTOXIFYLLINE SCH MG: 400 TABLET, FILM COATED, EXTENDED RELEASE ORAL at 08:11

## 2023-03-26 RX ADMIN — AMLODIPINE BESYLATE SCH MG: 5 TABLET ORAL at 08:11

## 2023-03-26 RX ADMIN — GABAPENTIN SCH MG: 300 CAPSULE ORAL at 16:25

## 2023-03-26 RX ADMIN — FERROUS SULFATE TAB 325 MG (65 MG ELEMENTAL FE) SCH MG: 325 (65 FE) TAB at 08:11

## 2023-03-26 RX ADMIN — CHOLESTYRAMINE SCH G: 4 POWDER, FOR SUSPENSION ORAL at 00:28

## 2023-03-26 NOTE — NUR
TELE RN CLOSING NOTES



PATIENT RESTING IN BED, A/Ox2-3 EPISODES OF CONFUSION. STABLE ON ROOM AIR, NO S/S OF 
RESPIRATORY DISTRESS. ON TELE MONITORING BUT CURRENTLY REFUSING. NO IV ACCESS PATIENT IS 
REFUSING. PATIENT IS INCONTINENT USES DIAPER. SKIN ISSUES: SACRAL REDNESS, L LEG, R LEG L 
THIGH BRUISES, R&L SOLE DRY SKIN. SAFETY MEASURES MAINTAINED: BED LOCKED AND IN LOWEST 
POSITION, HOB ELEVATED, SIDE RAILS UPx3, BED ALARM ON, CALL LIGHT WITHIN REACH. WILL ENDORSE 
TO NEXT SHIFT ANY GIDEON.

## 2023-03-26 NOTE — NUR
RN NOTES



PATIENT COMPLAINED OF HEADACHE, PRN TYLENOL ADMINISTERED. PATIENT ALSO COMPLAINED OF PAIN OF 
R ARM, ICE PACK APPLIED. WILL CONTINUE TO MONITOR.

## 2023-03-26 NOTE — NUR
RN NOTES



PATIENT EXPRESSED NEEDING SOMETHING TO CALM DOWN BEFORE MRI. PRN ATIVAN ADMINISTERED. 
PATIENT JUST PICKED UP FOR MRI IN STABLE CONDITION. WILL AWAIT PATIENT RETURN.

## 2023-03-26 NOTE — NUR
ALERT/ORIENTED X2, ROOM AIR, NO COMPLAIN OF PAIN, EPISODES OF CONFUSION, RE-ORIENTED PRN, 
SACRAL REDNESS, ZGUARD APPLIED, INCONTINENT OF BOWEL AND BLADDER. NO LOOSE BM THIS SHIFT. 
HAD BM DURING DAY SHIFT, STOOL SAMPLE SENT TO LAB FOR C-DIFF, PENDING RESULTS. PSYCH 
CONSULT, PT, OT EVAL AND TREATMENT, MRI BRAIN WO CONTRAST, TELEPHONE CONSENT OBTAINED FROM 
POA DADA FAIELLA.

## 2023-03-26 NOTE — NUR
TELE RN OPENING NOTES



RECEIVED PATIENT RESTING IN BED, A/Ox2-3 EPISODES OF CONFUSION. ON ROOM AIR, NO S/S OF 
RESPIRATORY DISTRESS. ON TELE MONITORING BUT CURRENTLY REFUSING. NO IV ACCESS PATIENT IS 
REFUSING. PATIENT IS INCONTINENT USES DIAPER. SKIN ISSUES: SACRAL REDNESS, L LEG, R LEG L 
THIGH BRUISES, R&L SOLE DRY SKIN. SAFETY MEASURES IN PLACE: BED LOCKED AND IN LOWEST 
POSITION, HOB ELEVATED, SIDE RAILS UPx3, BED ALARM ON, CALL LIGHT WITHIN REACH. WILL 
CONTINUE TO MONITOR.

## 2023-03-27 VITALS — DIASTOLIC BLOOD PRESSURE: 55 MMHG | SYSTOLIC BLOOD PRESSURE: 120 MMHG

## 2023-03-27 VITALS — SYSTOLIC BLOOD PRESSURE: 124 MMHG | DIASTOLIC BLOOD PRESSURE: 47 MMHG

## 2023-03-27 VITALS — SYSTOLIC BLOOD PRESSURE: 147 MMHG | DIASTOLIC BLOOD PRESSURE: 64 MMHG

## 2023-03-27 VITALS — SYSTOLIC BLOOD PRESSURE: 115 MMHG | DIASTOLIC BLOOD PRESSURE: 50 MMHG

## 2023-03-27 LAB
BILIRUB UR QL STRIP: NEGATIVE
BUN SERPL-MCNC: 60 MG/DL (ref 7–18)
CALCIUM SERPL-MCNC: 10.5 MG/DL (ref 8.5–10.1)
CHLORIDE SERPL-SCNC: 112 MMOL/L (ref 98–107)
CO2 SERPL-SCNC: 17 MMOL/L (ref 21–32)
COLOR UR: YELLOW
CREAT SERPL-MCNC: 1.8 MG/DL (ref 0.6–1.3)
DEPRECATED SQUAMOUS URNS QL MICRO: (no result) /HPF
GLUCOSE SERPL-MCNC: 111 MG/DL (ref 74–106)
GLUCOSE UR STRIP-MCNC: NEGATIVE MG/DL
LEUKOCYTE ESTERASE UR QL STRIP: (no result)
NITRITE UR QL STRIP: POSITIVE
PH UR STRIP: 6 [PH] (ref 5–8)
POTASSIUM SERPL-SCNC: 5.5 MMOL/L (ref 3.5–5.1)
PROT UR QL STRIP: NEGATIVE MG/DL
SODIUM SERPL-SCNC: 140 MMOL/L (ref 136–145)
UROBILINOGEN UR STRIP-MCNC: 0.2 EU/DL
WBC #/AREA URNS HPF: (no result) /HPF
WBC #/AREA URNS HPF: (no result) /HPF (ref 0–3)

## 2023-03-27 RX ADMIN — ACETAMINOPHEN PRN MG: 325 TABLET ORAL at 20:19

## 2023-03-27 RX ADMIN — ONDANSETRON PRN MG: 4 TABLET, ORALLY DISINTEGRATING ORAL at 00:34

## 2023-03-27 RX ADMIN — FERROUS SULFATE TAB 325 MG (65 MG ELEMENTAL FE) SCH MG: 325 (65 FE) TAB at 09:07

## 2023-03-27 RX ADMIN — LORAZEPAM PRN MG: 0.5 TABLET ORAL at 17:07

## 2023-03-27 RX ADMIN — AMLODIPINE BESYLATE SCH MG: 5 TABLET ORAL at 09:08

## 2023-03-27 RX ADMIN — GABAPENTIN SCH MG: 300 CAPSULE ORAL at 09:07

## 2023-03-27 RX ADMIN — CHOLESTYRAMINE SCH G: 4 POWDER, FOR SUSPENSION ORAL at 12:36

## 2023-03-27 RX ADMIN — DORZOLAMIDE HYDROCHLORIDE AND TIMOLOL MALEATE SCH DROP: 20; 5 SOLUTION OPHTHALMIC at 09:05

## 2023-03-27 RX ADMIN — CHOLESTYRAMINE SCH G: 4 POWDER, FOR SUSPENSION ORAL at 00:30

## 2023-03-27 RX ADMIN — LEVOTHYROXINE SODIUM SCH MCG: 75 TABLET ORAL at 09:07

## 2023-03-27 RX ADMIN — ENOXAPARIN SODIUM SCH MG: 30 INJECTION SUBCUTANEOUS at 21:00

## 2023-03-27 RX ADMIN — PENTOXIFYLLINE SCH MG: 400 TABLET, FILM COATED, EXTENDED RELEASE ORAL at 09:07

## 2023-03-27 RX ADMIN — PENTOXIFYLLINE SCH MG: 400 TABLET, FILM COATED, EXTENDED RELEASE ORAL at 16:51

## 2023-03-27 RX ADMIN — SERTRALINE HYDROCHLORIDE SCH MG: 50 TABLET, FILM COATED ORAL at 09:08

## 2023-03-27 RX ADMIN — GABAPENTIN SCH MG: 300 CAPSULE ORAL at 16:51

## 2023-03-27 RX ADMIN — DORZOLAMIDE HYDROCHLORIDE AND TIMOLOL MALEATE SCH DROP: 20; 5 SOLUTION OPHTHALMIC at 16:53

## 2023-03-27 RX ADMIN — CALCIUM CARBONATE (ANTACID) CHEW TAB 500 MG PRN MG: 500 CHEW TAB at 21:07

## 2023-03-27 RX ADMIN — LISINOPRIL SCH MG: 20 TABLET ORAL at 09:07

## 2023-03-27 RX ADMIN — Medication SCH MG: at 09:08

## 2023-03-27 RX ADMIN — FERROUS SULFATE TAB 325 MG (65 MG ELEMENTAL FE) SCH MG: 325 (65 FE) TAB at 16:51

## 2023-03-27 RX ADMIN — CALCIUM CARBONATE (ANTACID) CHEW TAB 500 MG PRN MG: 500 CHEW TAB at 01:37

## 2023-03-27 NOTE — NUR
SS note: 



SS requested as pt. is expressing not wanting to return to board & care that daughter/KENNY WISEMAN (DTR) TEL:[phone] is requesting pt. to discharge to. CHANDANA called daughterKENNY (DTR) TEL:[phone] and requested she provide DPOA paperwork to determine if 
daughter already has reese over healthcare. Kenny requested SW call the facility: [facility] [phone] and have them send over the DPOA paperwork. CHANDANA called 
facility and spoke to Luna who stated she would send DPOA paperwork as soon as possible. 



Pt.'s daughter expressed that she has concerns that pt.may have early Dementia due to 
behavioral problems & confusion. Pt.  has had psych and neuro consult see notes for details.

## 2023-03-27 NOTE — NUR
RN OPENING NOTE



RECEIVED PATIENT IN BED ASLEEP, EASILY AROUSED. NO SIGNS OF ACUTE DISTRESS NOTED. ON ROOM 
AIR, TOLERATING WELL. NO SOB NOTED, BREATHING EVEN AND UNLABORED. ON TELE MONITOR BUT 
PATIENT REFUSING. DENIES ANY PAIN AT THIS TIME. SAFETY MEASURE IN PLACE. BED IN LOW AND 
LOCKED POSITION, SIDE RAILS UP X2, CALL LIGHT PLACED WITHIN EASY REACH. WILL CONTINUE TO 
MONITOR PATIENT.

## 2023-03-27 NOTE — NUR
RN CLOSING NOTE



PATIENT IN BED AWAKE, A/O X2-3. FORGETFUL. NO SIGNS OF ACUTE DISTRESS NOTED. REMAINS STABLE 
ON ROOM AIR, NO SOB NOTED, BREATHING EVEN AND UNLABORED. ON TELE MONITOR BUT PATIENT 
REFUSING. DENIES ANY PAIN AT THIS TIME. ALL DUE MEDS GIVEN, TOLERATED WELL. SAFETY MEASURE 
MAINTAINED. BED IN LOW AND LOCKED POSITION, SIDE RAILS UP X2, CALL LIGHT PLACED WITHIN EASY 
REACH. WILL ENDORSE TO NEXT SHIFT FOR CONTINUITY OF CARE.

## 2023-03-27 NOTE — NUR
noc rn note



Received patient with eyes closed, easy to arouse. patient mumbling to self. no s/s of 
apparent distress on room air. no c/o pain at this time. refused cardiac monitor as 
endorsed. safety in place-- bed in lowest locked position, call light within reach, side 
rails up X3, bed alarm in place. will cont with the plan of care for patient.

## 2023-03-27 NOTE — NUR
noc rn note



patient c/o 9/10 pain on her rt. shoulder but refused Morphine and asked for Tylenol 
instead. Unopened syringe of Morphine returned in the [facility] with MILI Barreto and patient 
given Tylenol as ordered PRN. will re-assess.

## 2023-03-27 NOTE — NUR
TELE RN CLOSING NOTES



PATIENT RESTING IN BED, A/Ox2-3 EPISODES OF CONFUSION. STABLE ON ROOM AIR, NO S/S OF 
RESPIRATORY DISTRESS. ON TELE MONITORING BUT CURRENTLY REFUSING. NO IV ACCESS PATIENT IS 
REFUSING. PATIENT IS INCONTINENT USES DIAPER. SKIN ISSUES: SACRAL REDNESS, L LEG, R LEG L 
THIGH BRUISES, R&L SOLE DRY SKIN. SAFETY MEASURES MAINTAINED: BED LOCKED AND IN LOWEST 
POSITION, HOB ELEVATED, SIDE RAILS UPx3, BED ALARM ON, CALL LIGHT WITHIN REACH. WILL ENDORSE 
TO NEXT SHIFT ANY GIDEON. Clear

## 2023-03-28 VITALS — DIASTOLIC BLOOD PRESSURE: 70 MMHG | SYSTOLIC BLOOD PRESSURE: 149 MMHG

## 2023-03-28 VITALS — SYSTOLIC BLOOD PRESSURE: 149 MMHG | DIASTOLIC BLOOD PRESSURE: 60 MMHG

## 2023-03-28 VITALS — DIASTOLIC BLOOD PRESSURE: 69 MMHG | SYSTOLIC BLOOD PRESSURE: 144 MMHG

## 2023-03-28 LAB
BUN SERPL-MCNC: 43 MG/DL (ref 7–18)
CALCIUM SERPL-MCNC: 9.9 MG/DL (ref 8.5–10.1)
CHLORIDE SERPL-SCNC: 111 MMOL/L (ref 98–107)
CO2 SERPL-SCNC: 18 MMOL/L (ref 21–32)
CREAT SERPL-MCNC: 1.4 MG/DL (ref 0.6–1.3)
GLUCOSE SERPL-MCNC: 111 MG/DL (ref 74–106)
POTASSIUM SERPL-SCNC: 4.9 MMOL/L (ref 3.5–5.1)
SODIUM SERPL-SCNC: 139 MMOL/L (ref 136–145)

## 2023-03-28 RX ADMIN — FERROUS SULFATE TAB 325 MG (65 MG ELEMENTAL FE) SCH MG: 325 (65 FE) TAB at 08:31

## 2023-03-28 RX ADMIN — GABAPENTIN SCH MG: 300 CAPSULE ORAL at 08:31

## 2023-03-28 RX ADMIN — DORZOLAMIDE HYDROCHLORIDE AND TIMOLOL MALEATE SCH DROP: 20; 5 SOLUTION OPHTHALMIC at 09:01

## 2023-03-28 RX ADMIN — SERTRALINE HYDROCHLORIDE SCH MG: 50 TABLET, FILM COATED ORAL at 08:30

## 2023-03-28 RX ADMIN — CHOLESTYRAMINE SCH G: 4 POWDER, FOR SUSPENSION ORAL at 12:19

## 2023-03-28 RX ADMIN — AMLODIPINE BESYLATE SCH MG: 5 TABLET ORAL at 08:32

## 2023-03-28 RX ADMIN — LISINOPRIL SCH MG: 20 TABLET ORAL at 08:31

## 2023-03-28 RX ADMIN — CHOLESTYRAMINE SCH G: 4 POWDER, FOR SUSPENSION ORAL at 00:50

## 2023-03-28 RX ADMIN — Medication SCH MG: at 08:30

## 2023-03-28 RX ADMIN — LEVOTHYROXINE SODIUM SCH MCG: 75 TABLET ORAL at 08:30

## 2023-03-28 RX ADMIN — PENTOXIFYLLINE SCH MG: 400 TABLET, FILM COATED, EXTENDED RELEASE ORAL at 08:58

## 2023-03-28 RX ADMIN — LORAZEPAM PRN MG: 0.5 TABLET ORAL at 11:27

## 2023-03-28 NOTE — NUR
RN NOTES



PATIENT  IS  WITH ORDER  OF  IV  ATB  OF ROCEPHIN  BUT PATIENT   REFUSED  , OFFERED  3X  THE 
  IV  ACCESS  AND MEDICATIONS   AND  STILL  REFUSING  , MD  AWARE  AND PT  AWARE  ABOUT THE  
RISK  AND  CONSEQUENCES  OF  REFUSING  MEDICATIONS .

## 2023-03-28 NOTE — NUR
RN  DISCHARGE   NOTES  



PATIENT  IS  WITH  ORDER  FOR DISCHARGE   TO HOME  WITH  DAUGHTER  RIMMA  ,  PATIENT  
REQUESTING  TO  GO  TO HER  DAUGHTERS  HOME  .   ALL DISCHARGE  PAPERS   WERE  PREPARED  AND 
  DISCHARGE   INSTRUCTIONS   GIVEN  TO THE   PATIENT  AND   DAUGHTER  RIMMA REGARDING  
TO  CONTINUE  ANTIBIOTICS  FOR UTI ,  HOME  MEDICATIONS AND  FOLLOW  WITH  PCP  AND  WHEN TO 
 CALL  911   IN  CASE  OF  EMERGENCY .  PATIENT  REFUSED  BODY  REASSESSMENT   FOR   
DISCHARGE  AND    ALL  BELONGINGS  WERE  TAKEN BY  THE   PATIENT   AND   FAMILY  AND  
BELONGINGS   FORM  WAS  SIGNED ,    PRESCRIPTION OF  ATIVAN  WAS  GIVEN TO THE   DAUGHTER  , 
TRANSPORTATION  WAS  PROVIDED  BY  SHAJI SHANKS  ,  LEFT  WITH  NO  SOB OR  DISTRESS  
NOTED  AND   IN A  STABLE  CONDITION , ASSISTED  TO THE   LOBBY  BY CNA   AND  IN THE   CAR 
.

## 2023-03-28 NOTE — NUR
RN   OPENING NOTES 



RECEIVED  PATIENT   ON  BED  AWAKE , A/OX3 , ABLE OT MAKE  NEEDS KNOWN , PER   NIGHT    
NURSE   RN REFUSED  TELEMONITOR   ,   REFUSED   IV  ACCESS  , ON  ROOM  AIR AND   NO  SOB OR 
DISTRESS  NOTED  , CALL  WITHIN REACH , SAFETY  MEASURE  PROVIDED  AND   WILL  CONTINUE   TO 
MONITOR